# Patient Record
Sex: MALE | Race: WHITE | NOT HISPANIC OR LATINO | Employment: STUDENT | URBAN - METROPOLITAN AREA
[De-identification: names, ages, dates, MRNs, and addresses within clinical notes are randomized per-mention and may not be internally consistent; named-entity substitution may affect disease eponyms.]

---
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
## 2021-01-06 ENCOUNTER — TRANSFERRED RECORDS (OUTPATIENT)
Dept: HEALTH INFORMATION MANAGEMENT | Facility: CLINIC | Age: 21
End: 2021-01-06

## 2023-08-18 ENCOUNTER — OFFICE VISIT (OUTPATIENT)
Dept: FAMILY MEDICINE | Facility: CLINIC | Age: 23
End: 2023-08-18
Payer: COMMERCIAL

## 2023-08-18 VITALS
HEIGHT: 71 IN | DIASTOLIC BLOOD PRESSURE: 80 MMHG | BODY MASS INDEX: 21.42 KG/M2 | OXYGEN SATURATION: 98 % | TEMPERATURE: 97.4 F | HEART RATE: 72 BPM | SYSTOLIC BLOOD PRESSURE: 120 MMHG | WEIGHT: 153 LBS | RESPIRATION RATE: 20 BRPM

## 2023-08-18 DIAGNOSIS — K64.9 HEMORRHOIDS, UNSPECIFIED HEMORRHOID TYPE: Primary | ICD-10-CM

## 2023-08-18 PROCEDURE — 99203 OFFICE O/P NEW LOW 30 MIN: CPT | Performed by: FAMILY MEDICINE

## 2023-08-18 RX ORDER — HYDROCORTISONE 25 MG/G
CREAM TOPICAL 2 TIMES DAILY PRN
Qty: 30 G | Refills: 1 | Status: SHIPPED | OUTPATIENT
Start: 2023-08-18

## 2023-08-18 ASSESSMENT — PAIN SCALES - GENERAL: PAINLEVEL: MODERATE PAIN (4)

## 2023-08-18 NOTE — PATIENT INSTRUCTIONS
Fiber supplementation with Citrucel   Hydrocortisone cream twice daily for 10 days, then stop for a week and can continue again after that.   Sitz Baths     Do not itch.

## 2023-08-18 NOTE — PROGRESS NOTES
Assessment & Plan     Hemorrhoids, unspecified hemorrhoid type  Fiber supplementation with Citrucel   Preparation H Hydrocortisone cream 2.5% to be used twice daily for 10 days then stop.   Sitz Baths   -- discussed the importance of not scratching/ itching the rectum.   -- Patient to follow up if symptoms do not improve.   - hydrocortisone, Perianal, (HYDROCORTISONE) 2.5 % cream  Dispense: 30 g; Refill: 1      The risks, benefits and treatment options of prescribed medications or other treatments have been discussed with the patient. The patient verbalized their understanding and should call or follow up if no improvement or if they develop further problems.      Marcell Arambula DO  [facility] KENNEY Perez is a 23 year old, presenting for the following health issues:  Rectal Problem        8/18/2023    10:39 AM   Additional Questions   Roomed by Keira EVANS       History of Present Illness       Reason for visit:  Itching in my anus area  Symptom onset:  More than a month  Symptoms include:  Itching and bloody when pooping at times  Symptom intensity:  Mild  Symptom progression:  Staying the same  Had these symptoms before:  Yes  Has tried/received treatment for these symptoms:  Yes  Previous treatment was successful:  No  What makes it worse:  No  What makes it better:  No    He eats 0-1 servings of fruits and vegetables daily.He consumes 0 sweetened beverage(s) daily.He exercises with enough effort to increase his heart rate 30 to 60 minutes per day.  He exercises with enough effort to increase his heart rate 3 or less days per week.   He is taking medications regularly.     Will have some bleeding with bowel movements.   Will get itching in the rectum, worse at night. Has been itching.   Has tried OTC hemorrhoid cream. Did this for a week in the past.   Will have some rectal bleeding with bowel movements.   No pain with bowel movements.   No abdominal pain at rest or with bowel  "movements.   No constipation.   No bowel straining.   Will get some pain after itching.   No family history of colon cancer.     No other complaints or concerns.       Review of Systems   Constitutional, HEENT, cardiovascular, pulmonary, gi and gu systems are negative, except as otherwise noted.      Objective    /80 (BP Location: Right arm, Patient Position: Chair, Cuff Size: Adult Regular)   Pulse 72   Temp 97.4  F (36.3  C) (Tympanic)   Resp 20   Ht 1.805 m (5' 11.06\")   Wt 69.4 kg (153 lb)   SpO2 98%   BMI 21.30 kg/m    Body mass index is 21.3 kg/m .  Physical Exam   General: alert, cooperative, no acute distress   CV: RRR, no murmur  Resp: non-labored breathing, clear to auscultation, no wheezing or rales   Abdomen: Soft, non-tender, no guarding.   Extremities: No peripheral edema, calves non-tender.   Rectal: small area of excoriation near rectum. No active bleeding. No external hemorrhoid. Internal hemorrhoid appreciated on KJ.     "

## 2023-10-10 ENCOUNTER — OFFICE VISIT (OUTPATIENT)
Dept: FAMILY MEDICINE | Facility: CLINIC | Age: 23
End: 2023-10-10
Payer: COMMERCIAL

## 2023-10-10 VITALS
OXYGEN SATURATION: 98 % | HEIGHT: 71 IN | DIASTOLIC BLOOD PRESSURE: 80 MMHG | BODY MASS INDEX: 21.42 KG/M2 | TEMPERATURE: 97.7 F | WEIGHT: 153 LBS | RESPIRATION RATE: 20 BRPM | SYSTOLIC BLOOD PRESSURE: 118 MMHG | HEART RATE: 66 BPM

## 2023-10-10 DIAGNOSIS — Z11.3 ROUTINE SCREENING FOR STI (SEXUALLY TRANSMITTED INFECTION): Primary | ICD-10-CM

## 2023-10-10 DIAGNOSIS — R59.0 LOCALIZED ENLARGED LYMPH NODES: ICD-10-CM

## 2023-10-10 LAB
BASO+EOS+MONOS # BLD AUTO: NORMAL 10*3/UL
BASO+EOS+MONOS NFR BLD AUTO: NORMAL %
BASOPHILS # BLD AUTO: 0 10E3/UL (ref 0–0.2)
BASOPHILS NFR BLD AUTO: 0 %
EOSINOPHIL # BLD AUTO: 0.2 10E3/UL (ref 0–0.7)
EOSINOPHIL NFR BLD AUTO: 4 %
ERYTHROCYTE [DISTWIDTH] IN BLOOD BY AUTOMATED COUNT: 12.1 % (ref 10–15)
HCT VFR BLD AUTO: 43.6 % (ref 40–53)
HCV AB SERPL QL IA: NONREACTIVE
HGB BLD-MCNC: 14.7 G/DL (ref 13.3–17.7)
HIV 1+2 AB+HIV1 P24 AG SERPL QL IA: NONREACTIVE
IMM GRANULOCYTES # BLD: 0 10E3/UL
IMM GRANULOCYTES NFR BLD: 0 %
LYMPHOCYTES # BLD AUTO: 1.6 10E3/UL (ref 0.8–5.3)
LYMPHOCYTES NFR BLD AUTO: 29 %
MCH RBC QN AUTO: 31.8 PG (ref 26.5–33)
MCHC RBC AUTO-ENTMCNC: 33.7 G/DL (ref 31.5–36.5)
MCV RBC AUTO: 94 FL (ref 78–100)
MONOCYTES # BLD AUTO: 0.5 10E3/UL (ref 0–1.3)
MONOCYTES NFR BLD AUTO: 9 %
NEUTROPHILS # BLD AUTO: 3.2 10E3/UL (ref 1.6–8.3)
NEUTROPHILS NFR BLD AUTO: 59 %
PLATELET # BLD AUTO: 212 10E3/UL (ref 150–450)
RBC # BLD AUTO: 4.62 10E6/UL (ref 4.4–5.9)
T PALLIDUM AB SER QL: NONREACTIVE
WBC # BLD AUTO: 5.5 10E3/UL (ref 4–11)

## 2023-10-10 PROCEDURE — 86780 TREPONEMA PALLIDUM: CPT | Performed by: FAMILY MEDICINE

## 2023-10-10 PROCEDURE — 87389 HIV-1 AG W/HIV-1&-2 AB AG IA: CPT | Performed by: FAMILY MEDICINE

## 2023-10-10 PROCEDURE — 99214 OFFICE O/P EST MOD 30 MIN: CPT | Performed by: FAMILY MEDICINE

## 2023-10-10 PROCEDURE — 86803 HEPATITIS C AB TEST: CPT | Performed by: FAMILY MEDICINE

## 2023-10-10 PROCEDURE — 85025 COMPLETE CBC W/AUTO DIFF WBC: CPT | Performed by: FAMILY MEDICINE

## 2023-10-10 PROCEDURE — 36415 COLL VENOUS BLD VENIPUNCTURE: CPT | Performed by: FAMILY MEDICINE

## 2023-10-10 ASSESSMENT — PAIN SCALES - GENERAL: PAINLEVEL: NO PAIN (0)

## 2023-10-10 NOTE — PATIENT INSTRUCTIONS
You will be contacted in 1-2 days for results of your lab tests.   Further recommendations thereafter as appropriate.    To schedule the ultrasound of the soft tissue of the neck, call [phone].     Read more information about your conditions in the handouts attached to this visit summary.

## 2023-10-10 NOTE — RESULT ENCOUNTER NOTE
Please call patient.    His complete blood count did not show any abnormality. Other tests are still pending.  Pursue ultrasound as we planned for the lumps on his neck.

## 2023-10-10 NOTE — PROGRESS NOTES
Assessment & Plan     Localized enlarged lymph nodes  While localized, there were at least palpable LN on the right posterior cervical chain.  No systemic symptoms or sign today.  Unclear etiology.  DDx: chronic idiopathic enlarged LN, occult lymphatic pathology, not consistent with lymphadenitis, occult infection.  Discussed work up to rule out occult pathology. Patient agreed to below.  He also agreed to screen for STI, particularly HIV.  Return precautions discussed and given to patient.   - US Head Neck Soft Tissue  - CBC with Platelets & Differential    Routine screening for STI (sexually transmitted infection)  Discussed course, transmission and prevention of STDs. Advised safe sexual practices.    - HIV Antigen Antibody Combo  - Hepatitis C Screen Reflex to HCV RNA Quant and Genotype  - Treponema Abs w Reflex to RPR and Titer  - Chlamydia trachomatis/Neisseria gonorrhoeae by PCR         Patient Instructions   You will be contacted in 1-2 days for results of your lab tests.   Further recommendations thereafter as appropriate.    To schedule the ultrasound of the soft tissue of the neck, call [phone].     Read more information about your conditions in the handouts attached to this visit summary.     Steven Love MD  [facility]    Helena Perez is a 23 year old, presenting for the following health issues:  Derm Problem        10/10/2023    10:10 AM   Additional Questions   Roomed by Keira EVANS       History of Present Illness       Reason for visit:  Lump or cyst in neck  Symptom onset:  More than a month  Symptoms include:  Just a lump  Symptom intensity:  Mild  Symptom progression:  Staying the same  Had these symptoms before:  No  What makes it worse:  No  What makes it better:  No    He eats 0-1 servings of fruits and vegetables daily.He consumes 0 sweetened beverage(s) daily.He exercises with enough effort to increase his heart rate 30 to 60 minutes per day.  He  "exercises with enough effort to increase his heart rate 5 days per week.   He is taking medications regularly.     Patient denies recent illness, or previous infection prior to noticing the lumps.  Patient denies fever, malaise, night sweats, chills, weight loss, sore throat or neck pain.  No known chronic conditions.  Patient is sexually active with one female partner. Uses condoms consistently. No previous STI screening done per patient.      Review of Systems   Constitutional, HEENT, cardiovascular, pulmonary, GI, , musculoskeletal, neuro, skin, endocrine and psych systems are negative, except as otherwise noted.      Objective    /80 (BP Location: Right arm, Patient Position: Chair, Cuff Size: Adult Regular)   Pulse 66   Temp 97.7  F (36.5  C) (Tympanic)   Resp 20   Ht 1.807 m (5' 11.16\")   Wt 69.4 kg (153 lb)   SpO2 98%   BMI 21.24 kg/m    Body mass index is 21.24 kg/m .  Physical Exam   GEN: alert, oriented x 3, NAD  SKIN: good turgor; no jaundice or visible rash  NECK: three various sized palpable lymph nodes on the right posterior cervical chain; no other palpable neck mass; no palpable thyromegaly      No results found for any visits on 10/10/23.                "